# Patient Record
Sex: MALE | Race: WHITE | URBAN - METROPOLITAN AREA
[De-identification: names, ages, dates, MRNs, and addresses within clinical notes are randomized per-mention and may not be internally consistent; named-entity substitution may affect disease eponyms.]

---

## 2019-12-02 ENCOUNTER — OFFICE VISIT (OUTPATIENT)
Dept: ORTHOPEDIC SURGERY | Age: 39
End: 2019-12-02
Payer: COMMERCIAL

## 2019-12-02 VITALS
HEART RATE: 60 BPM | SYSTOLIC BLOOD PRESSURE: 150 MMHG | HEIGHT: 72 IN | BODY MASS INDEX: 23.03 KG/M2 | DIASTOLIC BLOOD PRESSURE: 82 MMHG | WEIGHT: 170 LBS

## 2019-12-02 DIAGNOSIS — M25.551 RIGHT HIP PAIN: ICD-10-CM

## 2019-12-02 DIAGNOSIS — S73.191A TEAR OF RIGHT ACETABULAR LABRUM, INITIAL ENCOUNTER: Primary | ICD-10-CM

## 2019-12-02 DIAGNOSIS — S76.311D STRAIN OF RIGHT HAMSTRING MUSCLE, SUBSEQUENT ENCOUNTER: ICD-10-CM

## 2019-12-02 PROCEDURE — 99204 OFFICE O/P NEW MOD 45 MIN: CPT | Performed by: ORTHOPAEDIC SURGERY

## 2019-12-04 ENCOUNTER — HOSPITAL ENCOUNTER (OUTPATIENT)
Dept: PHYSICAL THERAPY | Age: 39
Setting detail: THERAPIES SERIES
Discharge: HOME | End: 2019-12-04
Payer: COMMERCIAL

## 2019-12-05 ENCOUNTER — HOSPITAL ENCOUNTER (OUTPATIENT)
Dept: PHYSICAL THERAPY | Age: 39
Setting detail: THERAPIES SERIES
Discharge: HOME | End: 2019-12-05
Payer: COMMERCIAL

## 2019-12-05 PROCEDURE — 97140 MANUAL THERAPY 1/> REGIONS: CPT | Performed by: PHYSICAL THERAPIST

## 2019-12-05 PROCEDURE — 97110 THERAPEUTIC EXERCISES: CPT | Performed by: PHYSICAL THERAPIST

## 2019-12-10 ENCOUNTER — HOSPITAL ENCOUNTER (OUTPATIENT)
Dept: PHYSICAL THERAPY | Age: 39
Setting detail: THERAPIES SERIES
Discharge: HOME | End: 2019-12-10
Payer: COMMERCIAL

## 2019-12-10 PROCEDURE — 97110 THERAPEUTIC EXERCISES: CPT | Performed by: PHYSICAL THERAPIST

## 2019-12-10 PROCEDURE — 97530 THERAPEUTIC ACTIVITIES: CPT | Performed by: PHYSICAL THERAPIST

## 2019-12-10 PROCEDURE — 97140 MANUAL THERAPY 1/> REGIONS: CPT | Performed by: PHYSICAL THERAPIST

## 2019-12-12 ENCOUNTER — HOSPITAL ENCOUNTER (OUTPATIENT)
Dept: PHYSICAL THERAPY | Age: 39
Setting detail: THERAPIES SERIES
Discharge: HOME | End: 2019-12-12
Payer: COMMERCIAL

## 2019-12-12 PROCEDURE — 97110 THERAPEUTIC EXERCISES: CPT | Performed by: PHYSICAL THERAPIST

## 2019-12-12 PROCEDURE — 97140 MANUAL THERAPY 1/> REGIONS: CPT | Performed by: PHYSICAL THERAPIST

## 2019-12-12 PROCEDURE — 97530 THERAPEUTIC ACTIVITIES: CPT | Performed by: PHYSICAL THERAPIST

## 2019-12-17 ENCOUNTER — APPOINTMENT (OUTPATIENT)
Dept: PHYSICAL THERAPY | Age: 39
End: 2019-12-17
Payer: COMMERCIAL

## 2019-12-19 ENCOUNTER — HOSPITAL ENCOUNTER (OUTPATIENT)
Dept: PHYSICAL THERAPY | Age: 39
Setting detail: THERAPIES SERIES
Discharge: HOME | End: 2019-12-19
Payer: COMMERCIAL

## 2019-12-19 PROCEDURE — 97140 MANUAL THERAPY 1/> REGIONS: CPT | Performed by: PHYSICAL THERAPIST

## 2019-12-19 PROCEDURE — 97110 THERAPEUTIC EXERCISES: CPT | Performed by: PHYSICAL THERAPIST

## 2019-12-19 PROCEDURE — 97112 NEUROMUSCULAR REEDUCATION: CPT | Performed by: PHYSICAL THERAPIST

## 2020-01-03 ENCOUNTER — HOSPITAL ENCOUNTER (OUTPATIENT)
Dept: PHYSICAL THERAPY | Age: 40
Setting detail: THERAPIES SERIES
Discharge: HOME | End: 2020-01-03
Payer: COMMERCIAL

## 2020-01-03 PROCEDURE — 97112 NEUROMUSCULAR REEDUCATION: CPT | Performed by: PHYSICAL THERAPIST

## 2020-01-03 PROCEDURE — 97110 THERAPEUTIC EXERCISES: CPT | Performed by: PHYSICAL THERAPIST

## 2020-01-03 PROCEDURE — 97140 MANUAL THERAPY 1/> REGIONS: CPT | Performed by: PHYSICAL THERAPIST

## 2020-01-03 NOTE — FLOWSHEET NOTE
[facility]    Physical Therapy Daily Treatment Note  Date:  1/3/2020    Patient Name:  Aleshia     :  1980  MRN: [id number]  Restrictions/Precautions:    Medical/Treatment Diagnosis Information:  · Diagnosis: S76. 311A  Strain of muscle, fascia and tendon of the posterior muscle group at thigh level, right thigh, initial encounter. · Treatment Diagnosis: M25.551, R26.2 increased pain, decreased flexibility, decreased strength and functional deficits limit ADLs and particaption in sports  Insurance/Certification information:  PT Insurance Information: PT BENEFITS 2019 FACILITY/ ANTHEM/ EFFECTIVE 17/ ACTIVE/ DED 4000 .20/ PAYS 100%/ OOP 4000 .20/ 60 VPCY/ 0 AUTH/ JACOBY REF# [id number]/ 12-3-19 PAG  Physician Information:  Referring Practitioner: Latosha Yoo MD    Has the plan of care been signed (Y/N):        []  Yes  [x]  No     Date of Patient follow up with Physician: 19      Is this a Progress Report:     []  Yes  [x]  No        If Yes:  Date Range for reporting period:  Beginning  Ending    Progress report will be due (10 Rx or 30 days whichever is less):       Recertification will be due (POC Duration  / 90 days whichever is less): 19         Visit # Insurance Allowable Auth Required   1  (2019) 60 []  Yes [x]  No        Functional Scale:        WOMAC 17/96 (17.7% deficit)    Latex Allergy:  [x]NO      []YES  Preferred Language for Healthcare:   [x]English       []other:    Pain level:  0/10     SUBJECTIVE:  Pt states that he is feeling a little bit better. Has noticed more improvement in the front of the leg/hip flexor compared to his HS, does not notice as much discomfort with flexion. Notes that running was most aggravating factor for HS so since he has not been running it is difficult to know how much better he is doing.      OBJECTIVE:              ROM PROM AROM Comments     Left Right Left 3x10 40# SL eccectric   RDL 3x10  25# SL     Steamboats 3x30\" Forward/back, B  3x30\" ABD, B Red band at ankles   Slider SL squat  Slider backward lunge 3x10  3x10 Perform as alternating sets                            Therapeutic Exercise and NMR EXR  [x] (72759) Provided verbal/tactile cueing for activities related to strengthening, flexibility, endurance, ROM for improvements in LE, proximal hip, and core control with self care, mobility, lifting, ambulation.  [] (50578) Provided verbal/tactile cueing for activities related to improving balance, coordination, kinesthetic sense, posture, motor skill, proprioception  to assist with LE, proximal hip, and core control in self care, mobility, lifting, ambulation and eccentric single leg control. NMR and Therapeutic Activities:    [x] (29015 or 83308) Provided verbal/tactile cueing for activities related to improving balance, coordination, kinesthetic sense, posture, motor skill, proprioception and motor activation to allow for proper function of core, proximal hip and LE with self care and ADLs  [] (98322) Gait Re-education- Provided training and instruction to the patient for proper LE, core and proximal hip recruitment and positioning and eccentric body weight control with ambulation re-education including up and down stairs     Home Exercise Program:    Initiated 12/5/19. Printed hand out given. Pt demonstrated proper form of each exercise and expressed verbal understanding of frequency and duration. Updated 12/10/19. Printed hand out given. Pt demonstrated proper form of each exercise and expressed verbal understanding of frequency and duration.   [x] (16838) Reviewed/Progressed HEP activities related to strengthening, flexibility, endurance, ROM of core, proximal hip and LE for functional self-care, mobility, lifting and ambulation/stair navigation   [] (24354)Reviewed/Progressed HEP activities related to improving balance, coordination, kinesthetic sense, posture, motor skill, proprioception of core, proximal hip and LE for self care, mobility, lifting, and ambulation/stair navigation      Manual Treatments:        Spoke with   regarding the use of Dry Needling     Dry needling manual therapy: consisted on the placement of 3 needles in the following muscles:  semimenbranous/semitendinosis(2), bicep femoris (1). A 60 mm needle was inserted, piston, rotated, and coned to produce intramuscular mobilization. These techniques were used to restore functional range of motion, reduce muscle spasm and induce healing in the corresponding musculature. (36183)  Clean Technique was utilized today while applying Dry needling treatment. The treatment sites where cleaned with 70% solution of  isopropyl alcohol . The PT washed their hands and utilized treatment gloves along with hand  prior to inserting the needles. All needles where removed and discarded in the appropriate sharps container. MD has given verbal and/or written approval for this treatment. [x] (38793) Provided manual therapy to mobilize LE, proximal hip and/or LS spine soft tissue/joints for the purpose of modulating pain, promoting relaxation,  increasing ROM, reducing/eliminating soft tissue swelling/inflammation/restriction, improving soft tissue extensibility and allowing for proper ROM for normal function with self care, mobility, lifting and ambulation.      Modalities:  none    Charges:  Timed Code Treatment Minutes: 61   Total Treatment Minutes: 61   Time In: 7:30  Time out: 8:53    [] EVAL (LOW) 21552 (typically 20 minutes face-to-face)  [] EVAL (MOD) 64368 (typically 30 minutes face-to-face)  [] EVAL (HIGH) 41627 (typically 45 minutes face-to-face)  [] RE-EVAL     [x] IV(31127) x 2   [] IONTO  [x] NMR (87025) x 1    [] VASO  [x] Manual (06857) x1      [] Other:  [] TA x      [] Mech Traction (13035)  [] ES(attended) (98908)      [] ES (un) (72589):     GOALS:   Patient stated No    Treatment/Activity Tolerance:  [x] Patient able to complete treatment  [] Patient limited by fatigue  [] Patient limited by pain     [] Patient limited by other medical complications  [] Other: Initiated DN this date. Received MD approval and pt consent. Pt tolerated DN well, several LTR response noted at medial HS, pt reported discomfort with LTR but was able to tolerate full treatment. Mild LTR noted at bicep femoris, pt noted more tolerable than semimembranosis/semitendinosis. Pt tolerated needling without any adverse reactions. Continue DN NPV with focus on medial HS. Also consider needling to rectus femoris to address anterior hip pain. Pt noted some discomfort in areas of needle insertion during exercise program, but otherwise able to complete without pain. Pt able to complete steam boats without UE support this date, but performed exercise slowly. Pt able to complete backward lunges without insertional pain at the HS this date, noted only muscle fatigue. Pt was provided with return to run program as he was able to complete exercise session without HS pain. Educated on RTR program and proper progression/regression. Pt to cont with PT to address decrease flexibility, decreased strength and functional deficits. PLAN: See eval  [x] Continue per plan of care [] Alter current plan (see comments above)  [] Plan of care initiated [] Hold pending MD visit [] Discharge      Electronically signed by:  Marilee Kwan PT DPT  Physical Therapist  [id number]  [email or web address]    Note: If patient does not return for scheduled/ recommended follow up visits, this note will serve as a discharge from care along with most recent update on progress.

## 2020-01-09 ENCOUNTER — HOSPITAL ENCOUNTER (OUTPATIENT)
Dept: PHYSICAL THERAPY | Age: 40
Setting detail: THERAPIES SERIES
Discharge: HOME | End: 2020-01-09
Payer: COMMERCIAL

## 2020-01-09 PROCEDURE — 97110 THERAPEUTIC EXERCISES: CPT | Performed by: PHYSICAL THERAPIST

## 2020-01-09 PROCEDURE — 97112 NEUROMUSCULAR REEDUCATION: CPT | Performed by: PHYSICAL THERAPIST

## 2020-01-09 PROCEDURE — 97140 MANUAL THERAPY 1/> REGIONS: CPT | Performed by: PHYSICAL THERAPIST

## 2020-01-09 PROCEDURE — 20561 NDL INSJ W/O NJX 3+ MUSC: CPT | Performed by: PHYSICAL THERAPIST

## 2020-01-09 NOTE — FLOWSHEET NOTE
[facility]    Physical Therapy Daily Treatment Note  Date:  2020    Patient Name:  Pauline Davis    :  1980  MRN: [id number]  Restrictions/Precautions:    Medical/Treatment Diagnosis Information:  · Diagnosis: S76. 311A  Strain of muscle, fascia and tendon of the posterior muscle group at thigh level, right thigh, initial encounter. · Treatment Diagnosis: M25.551, R26.2 increased pain, decreased flexibility, decreased strength and functional deficits limit ADLs and particaption in sports  Insurance/Certification information:  PT Insurance Information: PT BENEFITS 2019 FACILITY/ ANTHEM/ EFFECTIVE 17/ ACTIVE/ DED 4000 .20/ PAYS 100%/ OOP 4000 .20/ 60 VPCY/ 0 AUTH/ JACOBY REF# [id number]/ 12-3-19 PAG  Physician Information:  Referring Practitioner: Salvatore Higgins MD    Has the plan of care been signed (Y/N):        []  Yes  [x]  No     Date of Patient follow up with Physician: 19      Is this a Progress Report:     []  Yes  [x]  No        If Yes:  Date Range for reporting period:  Beginning  Ending    Progress report will be due (10 Rx or 30 days whichever is less): 18      Recertification will be due (POC Duration  / 90 days whichever is less): 19         Visit # Insurance Allowable Auth Required   1  (2019) 60 []  Yes [x]  No        Functional Scale:        WOMAC 17/96 (17.7% deficit)    Latex Allergy:  [x]NO      []YES  Preferred Language for Healthcare:   [x]English       []other:    Pain level:  0/10     SUBJECTIVE:  Pt completed stage 1 of the return to run program on Saturday and didn't have trouble, walked at 4.0 mph and ran at 6.0 mph on the treadmill.  On Monday he started Stage 2 and ranged between 6.0 and 7.0 mph for the run portion, he felt like he was not really running or pushing himself so for the last 2 minute cycle he increased to 8.0 mph and could feel pain the next day walking around school. Pt states that the pain gradually improved and he has not felt pain today. Notes that side stepping and monster walks are getting easy. OBJECTIVE:              ROM PROM AROM Comments     Left Right Left Right     Flexion WNL WNL*     P! In ant hip   Extension WNL WNL         Abduction             Adduction             ER 38 30         IR 24 20                          Flexibility Left Right Comments   Hamstrings 60 SLR  90/90 55 lacking 55 SLR  90/90 45 lacking     ITB (Obers test)     N/T   Hip flexor(Sam test)     N/T   gastroc     n/a   Rectus femoris(Elys test)     N/a                 Special  Test Left Right Comments   FABERS - -     Scour test - +     Impingement test     n/a   Trendelenburg test     n/a   FADIR - + P! Ant hip   Long sit - -    Standing march hypo -        Strength Left Right Comments   Hip flexors 4+/5* 5/5 P! ant hip   Hip extension 4/5 4/5     Hip abduction 4+/5 4/5     Hip adduction         Hip ER 4+/5 4+/5     Hip IR 4+/5 4+/5     Quads 5/5 5/5     Hamstrings 4+/5 3+/5 Prone knee extened      SL Balance EO R 30 sec L 30 Sec stable ankle strategy                           EC R 15 sec  L 4 sec step strategy    Joint mobility:               [x]? Normal                       []?Hypo              []?Hyper     Palpation: no TTP in ant hip or hamstrings at this time     Functional Mobility/Transfers: Independent      Posture: normal     Gait: (include devices/WB status) normal     Pelvis- iliac crest L higher, PSIS L higher, Level ASIS,     RESTRICTIONS/PRECAUTIONS: none    Exercises/Interventions:     ROM/STRETCHES     Seated hamstring  3x30\"    Seated piriformis     Supine piriformis     Supine figure 4     Quad       ITB     Butterfly     Tiger tail      Hip flexor stretch 3x30\" Prone strap   PREs     SLR     abduction     adduction     Supine abduction with tband     Seated hip flexion with ER     clams 3x10 blue ER/IR combo   SL dead lift     Monster walks     Wall sit with tband     bridges 2x10 triple x red SB   Quadruped opposite LE/UE reaches     Stool scoots  90\"/1.22\"/ 1.12'   SLS  3x30\" B BOSU   Side stepping 2 passes silver    Monster walks 2 passes silver    Hamstring curls 3x10 40# SL eccectric   RDL 3x10 25# SL     Steamboats  Red band at ankles   Slider SL squat  Slider backward lunge  Perform as alternating sets   Split squat 3x10 R only    Heel tap 3x10 R only                   Therapeutic Exercise and NMR EXR  [x] (27957) Provided verbal/tactile cueing for activities related to strengthening, flexibility, endurance, ROM for improvements in LE, proximal hip, and core control with self care, mobility, lifting, ambulation.  [] (02606) Provided verbal/tactile cueing for activities related to improving balance, coordination, kinesthetic sense, posture, motor skill, proprioception  to assist with LE, proximal hip, and core control in self care, mobility, lifting, ambulation and eccentric single leg control. NMR and Therapeutic Activities:    [x] (01405 or 32755) Provided verbal/tactile cueing for activities related to improving balance, coordination, kinesthetic sense, posture, motor skill, proprioception and motor activation to allow for proper function of core, proximal hip and LE with self care and ADLs  [] (35590) Gait Re-education- Provided training and instruction to the patient for proper LE, core and proximal hip recruitment and positioning and eccentric body weight control with ambulation re-education including up and down stairs     Home Exercise Program:    Initiated 12/5/19. Printed hand out given. Pt demonstrated proper form of each exercise and expressed verbal understanding of frequency and duration. Updated 12/10/19. Printed hand out given. Pt demonstrated proper form of each exercise and expressed verbal understanding of frequency and duration.   [x] (75906) Reviewed/Progressed HEP activities related to strengthening, flexibility, endurance, ROM of core, proximal hip and LE for functional self-care, mobility, lifting and ambulation/stair navigation   [] (32910)Reviewed/Progressed HEP activities related to improving balance, coordination, kinesthetic sense, posture, motor skill, proprioception of core, proximal hip and LE for self care, mobility, lifting, and ambulation/stair navigation      Manual Treatments:    [facility] 5'    Spoke with   regarding the use of Dry Needling     Dry needling manual therapy: consisted on the placement of 3 needles in the following muscles:  semimenbranous/semitendinosis(4), quadriceps femoris (2). A 60 mm needle was inserted, piston, rotated, and coned to produce intramuscular mobilization. These techniques were used to restore functional range of motion, reduce muscle spasm and induce healing in the corresponding musculature. (00107)  Clean Technique was utilized today while applying Dry needling treatment. The treatment sites where cleaned with 70% solution of  isopropyl alcohol . The PT washed their hands and utilized treatment gloves along with hand  prior to inserting the needles. All needles where removed and discarded in the appropriate sharps container. MD has given verbal and/or written approval for this treatment. [x] (05308) Provided manual therapy to mobilize LE, proximal hip and/or LS spine soft tissue/joints for the purpose of modulating pain, promoting relaxation,  increasing ROM, reducing/eliminating soft tissue swelling/inflammation/restriction, improving soft tissue extensibility and allowing for proper ROM for normal function with self care, mobility, lifting and ambulation.      Modalities:  none    Charges:  Timed Code Treatment Minutes: 53   Total Treatment Minutes: 80   Time In: 3:30  Time out: 4:50    [] EVAL (LOW) 18257 (typically 20 minutes face-to-face)  [] EVAL (MOD) 78874 (typically 30 minutes face-to-face)  [] EVAL (HIGH) 10713 (typically 45 minutes face-to-face)  [] RE-EVAL     [x] GL(93798) x 2   [] IONTO  [x] NMR (53781) x 1    [] VASO  [x] Manual (23412) x1      [x] Other: DN: 3 muscles  [] TA x      [] Mech Traction (30850)  [] ES(attended) (88554)      [] ES (un) (86501):     GOALS:   Patient stated goal: Return to running in best possible capacity, triathlon in summer   []? Progressing: []? Met: []? Not Met: []? Adjusted     Therapist goals for Patient:   Short Term Goals: To be achieved in: 2 weeks  1. Independent in HEP and progression per patient tolerance, in order to prevent re-injury. []? Progressing: []? Met: []? Not Met: []? Adjusted   2. Patient will have a decrease in pain to facilitate improvement in movement, function, and ADLs as indicated by Functional Deficits. []? Progressing: []? Met: []? Not Met: []? Adjusted     Long Term Goals: To be achieved in: 8 weeks  1. Disability index score of 5% or less for the LEFS to assist with reaching prior level of function. []? Progressing: []? Met: []? Not Met: []? Adjusted  2. Patient will demonstrate increased flexibility to <40 degrees lacking for hamstrings to allow for proper joint functioning as indicated by patients Functional Deficits. []? Progressing: []? Met: []? Not Met: []? Adjusted  3. Patient will demonstrate an increase in Strength to 4+/5 in LE to allow for proper functional mobility as indicated by patients Functional Deficits. []? Progressing: []? Met: []? Not Met: []? Adjusted  4. Patient will return to runninh without increased symptoms or restriction. []? Progressing: []? Met: []? Not Met: []? Adjusted       Overall Progression Towards Functional goals/ Treatment Progress Update:  [] Patient is progressing as expected towards functional goals listed. [] Progression is slowed due to complexities/Impairments listed. [] Progression has been slowed due to co-morbidities.   [x] Plan just implemented, too soon to assess goals progression <30days   [] Goals require adjustment due to lack

## 2020-01-16 ENCOUNTER — HOSPITAL ENCOUNTER (OUTPATIENT)
Dept: PHYSICAL THERAPY | Age: 40
Setting detail: THERAPIES SERIES
Discharge: HOME | End: 2020-01-16
Payer: COMMERCIAL

## 2020-01-16 PROCEDURE — 97110 THERAPEUTIC EXERCISES: CPT | Performed by: PHYSICAL THERAPIST

## 2020-01-16 PROCEDURE — 20561 NDL INSJ W/O NJX 3+ MUSC: CPT | Performed by: PHYSICAL THERAPIST

## 2020-01-16 PROCEDURE — 97140 MANUAL THERAPY 1/> REGIONS: CPT | Performed by: PHYSICAL THERAPIST

## 2020-01-16 PROCEDURE — 97112 NEUROMUSCULAR REEDUCATION: CPT | Performed by: PHYSICAL THERAPIST

## 2020-01-16 NOTE — FLOWSHEET NOTE
The [address] [place] [address] and Sports Rehabilitation, Elvis [facility]    Physical Therapy Daily Treatment Note  Date:  2020    Patient Name:  Dolly Montejo    :  1980  MRN: [id number]  Restrictions/Precautions:    Medical/Treatment Diagnosis Information:  · Diagnosis: S76. 311A  Strain of muscle, fascia and tendon of the posterior muscle group at thigh level, right thigh, initial encounter. · Treatment Diagnosis: M25.551, R26.2 increased pain, decreased flexibility, decreased strength and functional deficits limit ADLs and particaption in sports  Insurance/Certification information:  PT Insurance Information: PT BENEFITS 2019 FACILITY/ ANTHEM/ EFFECTIVE 17/ ACTIVE/ DED 4000 .20/ PAYS 100%/ OOP 4000 .20/ 60 VPCY/ 0 AUTH/ JACOBY REF# [id number]/ 12-3-19 PAG  Physician Information:  Referring Practitioner: Angelica Rincon MD    Has the plan of care been signed (Y/N):        []  Yes  [x]  No     Date of Patient follow up with Physician: 19      Is this a Progress Report:     []  Yes  [x]  No        If Yes:  Date Range for reporting period:  Beginning  Ending    Progress report will be due (10 Rx or 30 days whichever is less): 3/3/46      Recertification will be due (POC Duration  / 90 days whichever is less): 19         Visit # Insurance Allowable Auth Required   3  (2019) 60 []  Yes [x]  No        Functional Scale:        WOMAC 17/96 (17.7% deficit)    Latex Allergy:  [x]NO      []YES  Preferred Language for Healthcare:   [x]English       []other:    Pain level:  0/10     SUBJECTIVE:  Pt progressed to RTR Stage 2 on Friday, Monday stage 3 both fine. Yesterday stage 4, states that by the final round of the 4' run he had some pain, and had pain while walking around at school. OBJECTIVE:              ROM PROM AROM Comments     Left Right Left Right     Flexion WNL WNL*     P!  In ant hip   Extension WNL WNL         Abduction             Adduction             ER 38 30     only    Heel tap 3x10 R only    Step up 3x10 Work out bench             Weight machines     HS curl 3x10 SL 20#    Leg press          Therapeutic Exercise and NMR EXR  [x] (98040) Provided verbal/tactile cueing for activities related to strengthening, flexibility, endurance, ROM for improvements in LE, proximal hip, and core control with self care, mobility, lifting, ambulation.  [] (90688) Provided verbal/tactile cueing for activities related to improving balance, coordination, kinesthetic sense, posture, motor skill, proprioception  to assist with LE, proximal hip, and core control in self care, mobility, lifting, ambulation and eccentric single leg control. NMR and Therapeutic Activities:    [x] (83914 or 86387) Provided verbal/tactile cueing for activities related to improving balance, coordination, kinesthetic sense, posture, motor skill, proprioception and motor activation to allow for proper function of core, proximal hip and LE with self care and ADLs  [] (54383) Gait Re-education- Provided training and instruction to the patient for proper LE, core and proximal hip recruitment and positioning and eccentric body weight control with ambulation re-education including up and down stairs     Home Exercise Program:    Initiated 12/5/19. Printed hand out given. Pt demonstrated proper form of each exercise and expressed verbal understanding of frequency and duration. Updated 12/10/19. Printed hand out given. Pt demonstrated proper form of each exercise and expressed verbal understanding of frequency and duration. Updated 1/16/20. Printed handout given. Pt demonstrated proper form of each exercise and expressed verbal understanding of frequency and duration.   [x] (38220) Reviewed/Progressed HEP activities related to strengthening, flexibility, endurance, ROM of core, proximal hip and LE for functional self-care, mobility, lifting and ambulation/stair navigation   [] (82119)Reviewed/Progressed HEP activities related to improving balance, coordination, kinesthetic sense, posture, motor skill, proprioception of core, proximal hip and LE for self care, mobility, lifting, and ambulation/stair navigation      Manual Treatments:    [facility] 5'    Spoke with   regarding the use of Dry Needling     Dry needling manual therapy: consisted on the placement of 5 needles in the following muscles:  semimenbranous/semitendinosis(3), quadriceps femoris (2). A 60 mm needle was inserted, piston, rotated, and coned to produce intramuscular mobilization. These techniques were used to restore functional range of motion, reduce muscle spasm and induce healing in the corresponding musculature. (92903)  Clean Technique was utilized today while applying Dry needling treatment. The treatment sites where cleaned with 70% solution of  isopropyl alcohol . The PT washed their hands and utilized treatment gloves along with hand  prior to inserting the needles. All needles where removed and discarded in the appropriate sharps container. MD has given verbal and/or written approval for this treatment. [x] (21486) Provided manual therapy to mobilize LE, proximal hip and/or LS spine soft tissue/joints for the purpose of modulating pain, promoting relaxation,  increasing ROM, reducing/eliminating soft tissue swelling/inflammation/restriction, improving soft tissue extensibility and allowing for proper ROM for normal function with self care, mobility, lifting and ambulation.      Modalities:  none    Charges:  Timed Code Treatment Minutes: 55   Total Treatment Minutes: 55   Time In: 3:34  Time out: 5:08    [] EVAL (LOW) 89711 (typically 20 minutes face-to-face)  [] EVAL (MOD) 61679 (typically 30 minutes face-to-face)  [] EVAL (HIGH) 19673 (typically 45 minutes face-to-face)  [] RE-EVAL     [x] MJ(32337) x 1   [] IONTO  [x] NMR (70216) x 1    [] VASO  [x] Manual (38526) x1      [x] Other: DN: 3 muscles  [] TA x      [] Mech Traction (83118)  [] ES(attended) (42679)      [] ES (un) (76798):     GOALS:   Patient stated goal: Return to running in best possible capacity, triathlon in summer   []? Progressing: []? Met: []? Not Met: []? Adjusted     Therapist goals for Patient:   Short Term Goals: To be achieved in: 2 weeks  1. Independent in HEP and progression per patient tolerance, in order to prevent re-injury. []? Progressing: []? Met: []? Not Met: []? Adjusted   2. Patient will have a decrease in pain to facilitate improvement in movement, function, and ADLs as indicated by Functional Deficits. []? Progressing: []? Met: []? Not Met: []? Adjusted     Long Term Goals: To be achieved in: 8 weeks  1. Disability index score of 5% or less for the LEFS to assist with reaching prior level of function. []? Progressing: []? Met: []? Not Met: []? Adjusted  2. Patient will demonstrate increased flexibility to <40 degrees lacking for hamstrings to allow for proper joint functioning as indicated by patients Functional Deficits. []? Progressing: []? Met: []? Not Met: []? Adjusted  3. Patient will demonstrate an increase in Strength to 4+/5 in LE to allow for proper functional mobility as indicated by patients Functional Deficits. []? Progressing: []? Met: []? Not Met: []? Adjusted  4. Patient will return to runninh without increased symptoms or restriction. []? Progressing: []? Met: []? Not Met: []? Adjusted       Overall Progression Towards Functional goals/ Treatment Progress Update:  [] Patient is progressing as expected towards functional goals listed. [] Progression is slowed due to complexities/Impairments listed. [] Progression has been slowed due to co-morbidities.   [x] Plan just implemented, too soon to assess goals progression <30days   [] Goals require adjustment due to lack of progress  [] Patient is not progressing as expected and requires additional follow up with physician  [] Other    Prognosis for POC: [x] Good []

## 2020-01-22 ENCOUNTER — HOSPITAL ENCOUNTER (OUTPATIENT)
Dept: PHYSICAL THERAPY | Age: 40
Setting detail: THERAPIES SERIES
Discharge: HOME | End: 2020-01-22
Payer: COMMERCIAL

## 2020-01-22 PROCEDURE — 97110 THERAPEUTIC EXERCISES: CPT | Performed by: PHYSICAL THERAPIST

## 2020-01-22 PROCEDURE — 97112 NEUROMUSCULAR REEDUCATION: CPT | Performed by: PHYSICAL THERAPIST

## 2020-01-22 PROCEDURE — 97140 MANUAL THERAPY 1/> REGIONS: CPT | Performed by: PHYSICAL THERAPIST

## 2020-01-22 PROCEDURE — 20561 NDL INSJ W/O NJX 3+ MUSC: CPT | Performed by: PHYSICAL THERAPIST

## 2020-01-22 NOTE — FLOWSHEET NOTE
[address] and Sports Rehabilitation, [facility]    Physical Therapy Daily Treatment Note  Date:  2020    Patient Name:  Chuck Mckeon    :  1980  MRN: [id number]  Restrictions/Precautions:    Medical/Treatment Diagnosis Information:  · Diagnosis: S76. 311A  Strain of muscle, fascia and tendon of the posterior muscle group at thigh level, right thigh, initial encounter. · Treatment Diagnosis: M25.551, R26.2 increased pain, decreased flexibility, decreased strength and functional deficits limit ADLs and particaption in sports  Insurance/Certification information:  PT Insurance Information: PT BENEFITS 2019 FACILITY/ ANTHEM/ EFFECTIVE 17/ ACTIVE/ DED 4000 .20/ PAYS 100%/ OOP 4000 .20/ 60 VPCY/ 0 AUTH/ JACOBY REF# [id number]/ 12-3-19 PAG  Physician Information:  Referring Practitioner: Rachelle Kennedy MD    Has the plan of care been signed (Y/N):        []  Yes  [x]  No     Date of Patient follow up with Physician: 19      Is this a Progress Report:     []  Yes  [x]  No        If Yes:  Date Range for reporting period:  Beginning  Ending    Progress report will be due (10 Rx or 30 days whichever is less):       Recertification will be due (POC Duration  / 90 days whichever is less): 19         Visit # Insurance Allowable Auth Required   3  (2019) 60 []  Yes [x]  No        Functional Scale:        WOMAC 17/96 (17.7% deficit)    Latex Allergy:  [x]NO      []YES  Preferred Language for Healthcare:   [x]English       []other:    Pain level:  0/10     SUBJECTIVE:  Pt states that he has noticed some R sided back discomfort a little more than a week ago, states it is more of a discomfort and annoyance, does not limit him and is not exacerbated by anything specific. Has not bothered him during any of HEP. Pt states that he has had muscle soreness from the exercise program, no pain in the HS.  He has run 1x since LPV, maintain 3' x 3' stage.    OBJECTIVE:              ROM PROM AROM Comments     Left Right Left Right     Flexion WNL WNL*     P! In ant hip   Extension WNL WNL         Abduction             Adduction             ER 38 30         IR 24 20                          Flexibility Left Right Comments   Hamstrings 60 SLR  90/90 55 lacking 55 SLR  90/90 45 lacking     ITB (Obers test)     N/T   Hip flexor(Sam test)     N/T   gastroc     n/a   Rectus femoris(Elys test)     N/a                 Special  Test Left Right Comments   FABERS - -     Scour test - +     Impingement test     n/a   Trendelenburg test     n/a   FADIR - + P! Ant hip   Long sit - -    Standing march hypo -        Strength Left Right Comments   Hip flexors 4+/5* 5/5 P! ant hip   Hip extension 4/5 4/5     Hip abduction 4+/5 4/5     Hip adduction         Hip ER 4+/5 4+/5     Hip IR 4+/5 4+/5     Quads 5/5 5/5     Hamstrings 4+/5 3+/5 Prone knee extened      SL Balance EO R 30 sec L 30 Sec stable ankle strategy                           EC R 15 sec  L 4 sec step strategy    Joint mobility:               [x]? Normal                       []?Hypo              []?Hyper     Palpation: no TTP in ant hip or hamstrings at this time     Functional Mobility/Transfers: Independent      Posture: normal     Gait: (include devices/WB status) normal     Pelvis- iliac crest L higher, PSIS L higher, Level ASIS,     RESTRICTIONS/PRECAUTIONS: none    Exercises/Interventions:     ROM/STRETCHES     Bike 5' warm up before DN    Seated hamstring  3x30\"    Seated piriformis     Supine piriformis     Supine figure 4     Quad       ITB     Foam roll x15 QL    Child's post 3x30\" Bias R side   Hip flexor stretch 3x30\" Prone strap   PREs     SLR     abduction     adduction     Supine abduction with tband     Seated hip flexion with ER     clams  ER/IR combo   SL dead lift     Monster walks     Wall sit  3x30\" LLE on ball   bridges SL bridge 3x10    Bird dog 10x3\" B    Stool scoots     SLS  3x30\" B  3x30\" B BOSU  Level ground, eyes closed   Side stepping    Monster walks    RDL   3x10 15# DL    Steamboats  Red band at ankles   Slider SL squat  Slider backward lunge  Perform as alternating sets   Split squat     Heel tap     Step up  Work out bench             Weight machines     HS curl     Leg press 3x10 #         Therapeutic Exercise and NMR EXR  [x] (06832) Provided verbal/tactile cueing for activities related to strengthening, flexibility, endurance, ROM for improvements in LE, proximal hip, and core control with self care, mobility, lifting, ambulation.  [] (97520) Provided verbal/tactile cueing for activities related to improving balance, coordination, kinesthetic sense, posture, motor skill, proprioception  to assist with LE, proximal hip, and core control in self care, mobility, lifting, ambulation and eccentric single leg control. NMR and Therapeutic Activities:    [x] (42584 or 48902) Provided verbal/tactile cueing for activities related to improving balance, coordination, kinesthetic sense, posture, motor skill, proprioception and motor activation to allow for proper function of core, proximal hip and LE with self care and ADLs  [] (54038) Gait Re-education- Provided training and instruction to the patient for proper LE, core and proximal hip recruitment and positioning and eccentric body weight control with ambulation re-education including up and down stairs     Home Exercise Program:    Initiated 12/5/19. Printed hand out given. Pt demonstrated proper form of each exercise and expressed verbal understanding of frequency and duration. Updated 12/10/19. Printed hand out given. Pt demonstrated proper form of each exercise and expressed verbal understanding of frequency and duration. Updated 1/16/20. Printed handout given. Pt demonstrated proper form of each exercise and expressed verbal understanding of frequency and duration.   [x] (23247) Reviewed/Progressed HEP activities related to minutes face-to-face)  [] EVAL (MOD) 42389 (typically 30 minutes face-to-face)  [] EVAL (HIGH) 68175 (typically 45 minutes face-to-face)  [] RE-EVAL     [x] GJ(44974) x 2  [] IONTO  [x] NMR (56737) x 1    [] VASO  [x] Manual (82420) x1      [x] Other: DN: 3 muscles  [] TA x      [] Mech Traction (71655)  [] ES(attended) (36774)      [] ES (un) (05267):     GOALS:   Patient stated goal: Return to running in best possible capacity, triathlon in summer   []? Progressing: []? Met: []? Not Met: []? Adjusted     Therapist goals for Patient:   Short Term Goals: To be achieved in: 2 weeks  1. Independent in HEP and progression per patient tolerance, in order to prevent re-injury. []? Progressing: []? Met: []? Not Met: []? Adjusted   2. Patient will have a decrease in pain to facilitate improvement in movement, function, and ADLs as indicated by Functional Deficits. []? Progressing: []? Met: []? Not Met: []? Adjusted     Long Term Goals: To be achieved in: 8 weeks  1. Disability index score of 5% or less for the LEFS to assist with reaching prior level of function. []? Progressing: []? Met: []? Not Met: []? Adjusted  2. Patient will demonstrate increased flexibility to <40 degrees lacking for hamstrings to allow for proper joint functioning as indicated by patients Functional Deficits. []? Progressing: []? Met: []? Not Met: []? Adjusted  3. Patient will demonstrate an increase in Strength to 4+/5 in LE to allow for proper functional mobility as indicated by patients Functional Deficits. []? Progressing: []? Met: []? Not Met: []? Adjusted  4. Patient will return to runninh without increased symptoms or restriction. []? Progressing: []? Met: []? Not Met: []? Adjusted       Overall Progression Towards Functional goals/ Treatment Progress Update:  [] Patient is progressing as expected towards functional goals listed. [] Progression is slowed due to complexities/Impairments listed.   [] Progression has been progress.

## 2020-01-29 ENCOUNTER — HOSPITAL ENCOUNTER (OUTPATIENT)
Dept: PHYSICAL THERAPY | Age: 40
Setting detail: THERAPIES SERIES
Discharge: HOME | End: 2020-01-29
Payer: COMMERCIAL

## 2020-01-29 PROCEDURE — 97112 NEUROMUSCULAR REEDUCATION: CPT | Performed by: PHYSICAL THERAPIST

## 2020-01-29 PROCEDURE — 20561 NDL INSJ W/O NJX 3+ MUSC: CPT | Performed by: PHYSICAL THERAPIST

## 2020-01-29 PROCEDURE — 97110 THERAPEUTIC EXERCISES: CPT | Performed by: PHYSICAL THERAPIST

## 2020-01-29 PROCEDURE — 97140 MANUAL THERAPY 1/> REGIONS: CPT | Performed by: PHYSICAL THERAPIST

## 2020-01-29 NOTE — PLAN OF CARE
he is still unable to run to his usual desire and capacity d/t pain. Pt to cont with PT to address decrease flexibility, decreased strength and functional deficits. Pt would benefit from additional 4 weeks of PT to address remaining strength and flexibility deficits. Progress report will be due (10 Rx or 30 days whichever is less): 0/66/07      Recertification will be due (POC Duration  / 90 days whichever is less): 3/11/20         Visit # Insurance Allowable Auth Required   4  (4 2019) 60 []  Yes [x]  No        Functional Scale:         LEFS 71/80=88.75% (11.25% deficit)     Latex Allergy:  [x]NO      []YES  Preferred Language for Healthcare:   [x]English       []other:    Pain level:  0/10     SUBJECTIVE:  Pt states that he ran yesterday, still on Stage 3, states he felt fine. Overall pt feels good but gets occasional twinge of pain. Hip flexor gets occasional pinch when he gets past hip level. Pt states that HS and hip flexor were most agitated when running so it is difficult to know how improved he is.      OBJECTIVE:              ROM PROM AROM Comments     Left Right Left Right     Flexion WNL WNL        Extension WNL WNL         Abduction             Adduction             ER 38 30         IR 24 20                          Flexibility Left Right Comments   Hamstrings 70 SLR  90/90 40 lacking 65 SLR  90/90 25 lacking     ITB (Obers test)     N/T   Hip flexor(Sam test)     N/T   gastroc     n/a   Rectus femoris(Elys test)     N/a                 Special  Test Left Right Comments   FABERS - -     Scour test - -     Impingement test  -  -    Trendelenburg test  -  -    FADIR - -    Long sit - -    Standing march hypo -        Strength Left Right Comments   Hip flexors 4+/5* 5/5 *Discomfort anterior hip   Hip extension 4/5 4/5     Hip abduction 4+/5 4/5     Hip adduction         Hip ER 4+/5 4+/5     Hip IR 4+/5 4+/5     Quads 5/5 5/5     Hamstrings 4+/5 4/5 Prone knee extended and knee bent      SL Balance: lifting, ambulation and eccentric single leg control. NMR and Therapeutic Activities:    [x] (46335 or 02555) Provided verbal/tactile cueing for activities related to improving balance, coordination, kinesthetic sense, posture, motor skill, proprioception and motor activation to allow for proper function of core, proximal hip and LE with self care and ADLs  [] (65384) Gait Re-education- Provided training and instruction to the patient for proper LE, core and proximal hip recruitment and positioning and eccentric body weight control with ambulation re-education including up and down stairs     Home Exercise Program:    Initiated 12/5/19. Printed hand out given. Pt demonstrated proper form of each exercise and expressed verbal understanding of frequency and duration. Updated 12/10/19. Printed hand out given. Pt demonstrated proper form of each exercise and expressed verbal understanding of frequency and duration. Updated 1/16/20. Printed handout given. Pt demonstrated proper form of each exercise and expressed verbal understanding of frequency and duration. [x] (34043) Reviewed/Progressed HEP activities related to strengthening, flexibility, endurance, ROM of core, proximal hip and LE for functional self-care, mobility, lifting and ambulation/stair navigation   [] (21081)Reviewed/Progressed HEP activities related to improving balance, coordination, kinesthetic sense, posture, motor skill, proprioception of core, proximal hip and LE for self care, mobility, lifting, and ambulation/stair navigation      Manual Treatments:    IASTM HG prior to DN  Sweeping scanning to medial HS with HG8  Brushing and strumming to medial HS with HG9    Spoke with   regarding the use of Dry Needling     Dry needling manual therapy: consisted on the placement of 5 needles in the following muscles:  semimenbranous/semitendinosis(3), quadriceps femoris (2).   A 60 mm needle was inserted, piston, rotated, and coned to produce movement, function, and ADLs as indicated by Functional Deficits. [x]? Progressing: []? Met: []? Not Met: []? Adjusted      Long Term Goals: To be achieved in: 8 weeks  1. Disability index score of 5% or less for the LEFS to assist with reaching prior level of function. [x]? Progressing: []? Met: []? Not Met: []? Adjusted  2. Patient will demonstrate increased flexibility to <40 degrees lacking for hamstrings to allow for proper joint functioning as indicated by patients Functional Deficits. [x]? Progressing: []? Met: []? Not Met: []? Adjusted  3. Patient will demonstrate an increase in Strength to 4+/5 in LE to allow for proper functional mobility as indicated by patients Functional Deficits. [x]? Progressing: []? Met: []? Not Met: []? Adjusted  4. Patient will return to runninh without increased symptoms or restriction. [x]? Progressing: []? Met: []? Not Met: []? Adjusted       Overall Progression Towards Functional goals/ Treatment Progress Update:  [] Patient is progressing as expected towards functional goals listed. [] Progression is slowed due to complexities/Impairments listed. [] Progression has been slowed due to co-morbidities. [x] Plan just implemented, too soon to assess goals progression <30days   [] Goals require adjustment due to lack of progress  [] Patient is not progressing as expected and requires additional follow up with physician  [] Other    Prognosis for POC: [x] Good [] Fair  [] Poor      Patient requires continued skilled intervention: [x] Yes  [] No    Treatment/Activity Tolerance:  [x] Patient able to complete treatment  [] Patient limited by fatigue  [] Patient limited by pain     [] Patient limited by other medical complications  [] Other: 5th session of DN this date. Performed IASTM with HG to medial quad prior to DN again this date, pt tolerated well, quick erythremic response, less adhesions compared to LPV.  Good tolerance to DN, muscle integrity was smoother with needle insertion compared to prior sessions, less intense and less frequent twitch response noted this date. Pt demonstrates good improvement in HS flexibility since IE, greater improvement of R HS compared to L; despite improvement in flexibility moderate deficits still persist B. Pt demonstrates improved RLE strength, though HS and glute weakness on R still present in comparison to L. Pt noted discomfort with resisted hip flex though no longer pain as it was on IE. Pt is able to complete ADLs and IADLs without pain. Pt has tolerated initiation of return to run program, though he is still unable to run to his usual desire and capacity d/t pain. Pt to cont with PT to address decrease flexibility, decreased strength and functional deficits. Pt would benefit from additional 4 weeks of PT to address remaining strength and flexibility deficits. PLAN: See eval  [x] Continue per plan of care [] Alter current plan (see comments above)  [] Plan of care initiated [] Hold pending MD visit [] Discharge      Electronically signed by:  Scott Hook PT DPT  Physical Therapist  [id number]  [email or web address]    Note: If patient does not return for scheduled/ recommended follow up visits, this note will serve as a discharge from care along with most recent update on progress.

## 2020-02-03 ENCOUNTER — OFFICE VISIT (OUTPATIENT)
Dept: ORTHOPEDIC SURGERY | Age: 40
End: 2020-02-03
Payer: COMMERCIAL

## 2020-02-03 VITALS — BODY MASS INDEX: 24.38 KG/M2 | WEIGHT: 180 LBS | HEIGHT: 72 IN

## 2020-02-03 PROCEDURE — 99212 OFFICE O/P EST SF 10 MIN: CPT | Performed by: ORTHOPAEDIC SURGERY

## 2020-02-03 NOTE — PROGRESS NOTES
Chief Complaint  Follow-up (right leg. pt states that he is doing a little better than he was )      History of Present Illness:  Amparo Negro is a pleasant 44 y.o. male here today for followup of right hip/hamstring. We have been treating him conservatively for right hamstring tendinosis and hip impingement. She has been formal supervised physical therapy and OTC NSAIDs. Comes in today reporting he is trending in a good direction. He continues to feel he gets relief from physical therapy. No new injuries reported. Medical History:  Patient's medications, allergies, past medical, surgical, social and family histories were reviewed and updated as appropriate. ROS: Review of systems reviewed from Patient History Form completed today and available in the patient's chart under the Media tab. Pertinent items are noted in HPI  Review of systems reviewed from Patient History Form completed today and available in the patient's chart under the Media tab. Vital Signs: There were no vitals taken for this visit. Neuro: Alert & oriented x 3,  normal,  no focal deficits noted. Normal affect. Eyes: sclera clear  Ears: Normal external ear  Mouth:  No perioral lesions  Pulm: Respirations unlabored and regular  Pulse: Extremities well perfused. 2+ peripheral pulses. Skin: Warm. No ulcerations. Constitutional: The physical examination finds the patient to be well-developed and well-nourished. The patient is alert and oriented x3 and was cooperative throughout the visit. RIGHT Hip Examination:    Gait: Normal     Skin: There are no rashes, ulcerations or lesions.     Inspection:  No erythema swelling or signs of infection. Leg lengths symmetric. No evidence of hip flexion contracture.     Palpation: Hamstring palpable to ischial tuberosity. No tenderness over greater trochanter. Nontender over the gluteus medius. .  No palpable masses.     Range of Motion: Tight hamstrings bilaterally.  Full

## 2020-02-05 ENCOUNTER — HOSPITAL ENCOUNTER (OUTPATIENT)
Dept: PHYSICAL THERAPY | Age: 40
Setting detail: THERAPIES SERIES
Discharge: HOME | End: 2020-02-05
Payer: COMMERCIAL

## 2020-02-05 PROCEDURE — 20560 NDL INSJ W/O NJX 1 OR 2 MUSC: CPT | Performed by: PHYSICAL THERAPIST

## 2020-02-05 PROCEDURE — 97140 MANUAL THERAPY 1/> REGIONS: CPT | Performed by: PHYSICAL THERAPIST

## 2020-02-05 PROCEDURE — 97112 NEUROMUSCULAR REEDUCATION: CPT | Performed by: PHYSICAL THERAPIST

## 2020-02-05 PROCEDURE — 97110 THERAPEUTIC EXERCISES: CPT | Performed by: PHYSICAL THERAPIST

## 2020-02-05 NOTE — FLOWSHEET NOTE
Right     Flexion WNL WNL        Extension WNL WNL         Abduction             Adduction             ER 38 30         IR 24 20                          Flexibility Left Right Comments   Hamstrings 70 SLR  90/90 40 lacking 65 SLR  90/90 25 lacking     ITB (Obers test)     N/T   Hip flexor(Sam test)     N/T   gastroc     n/a   Rectus femoris(Elys test)     N/a                 Special  Test Left Right Comments   FABERS - -     Scour test - -     Impingement test  -  -    Trendelenburg test  -  -    FADIR - -    Long sit - -    Standing march hypo -        Strength Left Right Comments   Hip flexors 4+/5* 5/5 *Discomfort anterior hip   Hip extension 4/5 4/5     Hip abduction 4+/5 4/5     Hip adduction         Hip ER 4+/5 4+/5     Hip IR 4+/5 4+/5     Quads 5/5 5/5     Hamstrings 4+/5 4/5 Prone knee extended and knee bent      SL Balance:   EO R 30 sec L 30 Sec stable ankle strategy   EC R 15 sec  L 4 sec step strategy    Joint mobility:               [x]? Normal                       []?Hypo              []?Hyper     Palpation: no TTP in ant hip or hamstrings at this time     Functional Mobility/Transfers: Independent      Posture: normal     Gait: (include devices/WB status) normal     Pelvis- iliac crest L higher, PSIS L higher, Level ASIS,     RESTRICTIONS/PRECAUTIONS: none    Exercises/Interventions:     ROM/STRETCHES     Bike 5' warm up before DN    Seated hamstring  3x30\"    Seated piriformis     Supine piriformis     Supine figure 4     Quad       ITB     Foam roll    Child's post Bias R side   Hip flexor stretch 3x30\"Prone strap   PREs     clams  ER/IR combo   Wall sit   LLE on ball   Bridges 3x10 Feet on sliders   SL bridge 3x10 R only    Isometric hip flex  RLE on bench, lift on    Hip Thruster 3x10 20/25/25# Back supported on workout bench   Bird dog     Stool scoots     SLS    3x30\" B BOSU  Level ground, eyes closed   Side stepping    Monster walks    RDL 3x10 25# DL    Steamboats  Red band at ankles Slider SL squat  Slider backward lunge  Perform as alternating sets   Split squat     Heel tap     Step up  Work out bench   CC standing extension 3x10 15#    Prone knee flex 3x10 10/15/20#    Velia wall slide 3x10    Side plank 3x30\" R          Weight machines     HS curl     Leg press          Therapeutic Exercise and NMR EXR  [x] (60228) Provided verbal/tactile cueing for activities related to strengthening, flexibility, endurance, ROM for improvements in LE, proximal hip, and core control with self care, mobility, lifting, ambulation.  [] (75496) Provided verbal/tactile cueing for activities related to improving balance, coordination, kinesthetic sense, posture, motor skill, proprioception  to assist with LE, proximal hip, and core control in self care, mobility, lifting, ambulation and eccentric single leg control. NMR and Therapeutic Activities:    [x] (05155 or 81811) Provided verbal/tactile cueing for activities related to improving balance, coordination, kinesthetic sense, posture, motor skill, proprioception and motor activation to allow for proper function of core, proximal hip and LE with self care and ADLs  [] (18263) Gait Re-education- Provided training and instruction to the patient for proper LE, core and proximal hip recruitment and positioning and eccentric body weight control with ambulation re-education including up and down stairs     Home Exercise Program:    Initiated 12/5/19. Printed hand out given. Pt demonstrated proper form of each exercise and expressed verbal understanding of frequency and duration. Updated 12/10/19. Printed hand out given. Pt demonstrated proper form of each exercise and expressed verbal understanding of frequency and duration. Updated 1/16/20. Printed handout given. Pt demonstrated proper form of each exercise and expressed verbal understanding of frequency and duration.   [x] (89216) Reviewed/Progressed HEP activities related to strengthening, flexibility, endurance, ROM of core, proximal hip and LE for functional self-care, mobility, lifting and ambulation/stair navigation   [] (60425)Reviewed/Progressed HEP activities related to improving balance, coordination, kinesthetic sense, posture, motor skill, proprioception of core, proximal hip and LE for self care, mobility, lifting, and ambulation/stair navigation      Manual Treatments:    IASTM HG prior to DN  Sweeping scanning to medial HS with HG8  Brushing and strumming to medial HS with HG9    Spoke with   regarding the use of Dry Needling     Dry needling manual therapy: consisted on the placement of 3 needles in the following muscles:  semimenbranous/semitendinosis. A 60 mm needle was inserted, piston, rotated, and coned to produce intramuscular mobilization. These techniques were used to restore functional range of motion, reduce muscle spasm and induce healing in the corresponding musculature. (74872)  Clean Technique was utilized today while applying Dry needling treatment. The treatment sites where cleaned with 70% solution of  isopropyl alcohol . The PT washed their hands and utilized treatment gloves along with hand  prior to inserting the needles. All needles where removed and discarded in the appropriate sharps container. MD has given verbal and/or written approval for this treatment. [x] (55204) Provided manual therapy to mobilize LE, proximal hip and/or LS spine soft tissue/joints for the purpose of modulating pain, promoting relaxation,  increasing ROM, reducing/eliminating soft tissue swelling/inflammation/restriction, improving soft tissue extensibility and allowing for proper ROM for normal function with self care, mobility, lifting and ambulation.      Modalities:  none    Charges:  Timed Code Treatment Minutes: 63   Total Treatment Minutes: 63   Time In: 3:30  Time out: 5:00    [] EVAL (LOW) 17305 (typically 20 minutes face-to-face)  [] EVAL (MOD) 04022 (typically 30

## 2020-02-13 ENCOUNTER — HOSPITAL ENCOUNTER (OUTPATIENT)
Dept: PHYSICAL THERAPY | Age: 40
Setting detail: THERAPIES SERIES
Discharge: HOME | End: 2020-02-13
Payer: COMMERCIAL

## 2020-02-13 PROCEDURE — 20560 NDL INSJ W/O NJX 1 OR 2 MUSC: CPT | Performed by: PHYSICAL THERAPIST

## 2020-02-13 PROCEDURE — 97110 THERAPEUTIC EXERCISES: CPT | Performed by: PHYSICAL THERAPIST

## 2020-02-13 PROCEDURE — 97140 MANUAL THERAPY 1/> REGIONS: CPT | Performed by: PHYSICAL THERAPIST

## 2020-02-13 NOTE — FLOWSHEET NOTE
[facility]    Physical Therapy Daily Treatment Note  Date:  2020    Patient Name:  Concepción Gutierres    :  1980  MRN: [id number]  Restrictions/Precautions:    Medical/Treatment Diagnosis Information:  · Diagnosis: S76. 311A  Strain of muscle, fascia and tendon of the posterior muscle group at thigh level, right thigh, initial encounter. · Treatment Diagnosis: M25.551, R26.2 increased pain, decreased flexibility, decreased strength and functional deficits limit ADLs and particaption in sports  Insurance/Certification information:  PT Insurance Information: PT BENEFITS 2019 FACILITY/ ANTHEM/ EFFECTIVE 17/ ACTIVE/ DED 4000 .20/ PAYS 100%/ OOP 4000 .20/ 60 VPCY/ 0 AUTH/ JACOBY REF# [id number]/ 12-3-19 PAG  Physician Information:  Referring Practitioner: Court Duane, MD    Has the plan of care been signed (Y/N):        []  Yes  [x]  No     Date of Patient follow up with Physician: 2/3/20      Is this a Progress Report:     [x]  Yes  []  No        If Yes:  Date Range for reporting period:  Beginning   Ending     Progress report will be due (10 Rx or 30 days whichever is less): 3/82/80      Recertification will be due (POC Duration  / 90 days whichever is less): 3/11/20         Visit # Insurance Allowable Auth Required   6  (2019) 60 []  Yes [x]  No        Functional Scale:         LEFS 71/80=88.75% (11.25% deficit)     Latex Allergy:  [x]NO      []YES  Preferred Language for Healthcare:   [x]English       []other:    Pain level:  0/10     SUBJECTIVE:  Pt states that last Thursday and Friday his HS felt a little achy. He has not done much exercises the last week, was out of town and he has been sick.     OBJECTIVE:              ROM PROM AROM Comments     Left Right Left Right     Flexion WNL WNL        Extension WNL WNL         Abduction             Adduction             ER 38 30         IR 24 20                     hand Work out bench   CC standing extension 3x10 25#    Prone knee flex     Velia wall slide     Side plank 3x30\" R     [place] HS curl 3x6         Weight machines     HS curl     Leg press          Therapeutic Exercise and NMR EXR  [x] (57131) Provided verbal/tactile cueing for activities related to strengthening, flexibility, endurance, ROM for improvements in LE, proximal hip, and core control with self care, mobility, lifting, ambulation.  [] (88249) Provided verbal/tactile cueing for activities related to improving balance, coordination, kinesthetic sense, posture, motor skill, proprioception  to assist with LE, proximal hip, and core control in self care, mobility, lifting, ambulation and eccentric single leg control. NMR and Therapeutic Activities:    [x] (26139 or 58691) Provided verbal/tactile cueing for activities related to improving balance, coordination, kinesthetic sense, posture, motor skill, proprioception and motor activation to allow for proper function of core, proximal hip and LE with self care and ADLs  [] (40142) Gait Re-education- Provided training and instruction to the patient for proper LE, core and proximal hip recruitment and positioning and eccentric body weight control with ambulation re-education including up and down stairs     Home Exercise Program:    Initiated 12/5/19. Printed hand out given. Pt demonstrated proper form of each exercise and expressed verbal understanding of frequency and duration. Updated 12/10/19. Printed hand out given. Pt demonstrated proper form of each exercise and expressed verbal understanding of frequency and duration. Updated 1/16/20. Printed handout given. Pt demonstrated proper form of each exercise and expressed verbal understanding of frequency and duration.   [x] (56758) Reviewed/Progressed HEP activities related to strengthening, flexibility, endurance, ROM of core, proximal hip and LE for functional self-care, mobility, lifting and [x] LW(62910) x 2  [] IONTO  [] NMR (78670) x 1    [] VASO  [x] Manual (63374) x1      [x] Other: DN: 2 muscles  [] TA x      [] Mech Traction (13407)  [] ES(attended) (24772)      [] ES (un) (84422):     GOALS:   Patient stated goal: Return to running in best possible capacity, triathlon in summer   []? Progressing: []? Met: []? Not Met: []? Adjusted     Therapist goals for Patient:   Short Term Goals: To be achieved in: 2 weeks  1. Independent in HEP and progression per patient tolerance, in order to prevent re-injury. [x]? Progressing: []? Met: []? Not Met: []? Adjusted   2. Patient will have a decrease in pain to facilitate improvement in movement, function, and ADLs as indicated by Functional Deficits. [x]? Progressing: []? Met: []? Not Met: []? Adjusted      Long Term Goals: To be achieved in: 8 weeks  1. Disability index score of 5% or less for the LEFS to assist with reaching prior level of function. [x]? Progressing: []? Met: []? Not Met: []? Adjusted  2. Patient will demonstrate increased flexibility to <40 degrees lacking for hamstrings to allow for proper joint functioning as indicated by patients Functional Deficits. [x]? Progressing: []? Met: []? Not Met: []? Adjusted  3. Patient will demonstrate an increase in Strength to 4+/5 in LE to allow for proper functional mobility as indicated by patients Functional Deficits. [x]? Progressing: []? Met: []? Not Met: []? Adjusted  4. Patient will return to runninh without increased symptoms or restriction. [x]? Progressing: []? Met: []? Not Met: []? Adjusted       Overall Progression Towards Functional goals/ Treatment Progress Update:  [] Patient is progressing as expected towards functional goals listed. [] Progression is slowed due to complexities/Impairments listed. [] Progression has been slowed due to co-morbidities.   [x] Plan just implemented, too soon to assess goals progression <30days   [] Goals require adjustment due to lack of progress  [] Patient is not progressing as expected and requires additional follow up with physician  [] Other    Prognosis for POC: [x] Good [] Fair  [] Poor      Patient requires continued skilled intervention: [x] Yes  [] No    Treatment/Activity Tolerance:  [x] Patient able to complete treatment  [] Patient limited by fatigue  [] Patient limited by pain     [] Patient limited by other medical complications  [] Other: 7th session of DN this date. Performed IASTM with HG to medial quad prior to DN again this date, pt tolerated well, adhesions persist but less erythremic response. Continues to get LTR during DN, placed 4 needles along length of medial HS and left in situ for 5' before pistoning each needle, pt tolerated well and stated he liked this technique better. Less adhesions along medial HS following DN. Improved tissue extensibility following needling. Good tolerance to exercise program. Challenged by nordic HS curl and overall reported feeling more tired with exercise program today. Pt requires PT follow up to address ROM, strength and functional mobility deficits. PLAN: See eval  [x] Continue per plan of care [] Alter current plan (see comments above)  [] Plan of care initiated [] Hold pending MD visit [] Discharge      Electronically signed by:  Janae Lebron PT DPT  Physical Therapist  [id number]  [email or web address]    Note: If patient does not return for scheduled/ recommended follow up visits, this note will serve as a discharge from care along with most recent update on progress.

## 2020-02-20 ENCOUNTER — HOSPITAL ENCOUNTER (OUTPATIENT)
Dept: PHYSICAL THERAPY | Age: 40
Setting detail: THERAPIES SERIES
Discharge: HOME | End: 2020-02-20
Payer: COMMERCIAL

## 2020-02-20 PROCEDURE — 20560 NDL INSJ W/O NJX 1 OR 2 MUSC: CPT | Performed by: PHYSICAL THERAPIST

## 2020-02-20 PROCEDURE — 97140 MANUAL THERAPY 1/> REGIONS: CPT | Performed by: PHYSICAL THERAPIST

## 2020-02-20 PROCEDURE — 97110 THERAPEUTIC EXERCISES: CPT | Performed by: PHYSICAL THERAPIST

## 2020-02-20 NOTE — FLOWSHEET NOTE
progress  [] Patient is not progressing as expected and requires additional follow up with physician  [] Other    Prognosis for POC: [x] Good [] Fair  [] Poor      Patient requires continued skilled intervention: [x] Yes  [] No    Treatment/Activity Tolerance:  [x] Patient able to complete treatment  [] Patient limited by fatigue  [] Patient limited by pain     [] Patient limited by other medical complications  [] Other: 8th session of DN this date. Continues to get LTR during DN, placed 4 needles along length of medial HS and left in situ for 3' before pistoning each needle, pt tolerated well, great LTR noted distally. IASTM held this date d/t pt time constraint. Full strength program not performed this date d/t pt time constraint. Pt tolerated strength program well, noted greater HS fatigue this date. Pt requires PT follow up to address ROM, strength and functional mobility deficits. PLAN: See eval  [x] Continue per plan of care [] Alter current plan (see comments above)  [] Plan of care initiated [] Hold pending MD visit [] Discharge      Electronically signed by:  Nancy Euceda PT DPT  Physical Therapist  [id number]  [email or web address]    Note: If patient does not return for scheduled/ recommended follow up visits, this note will serve as a discharge from care along with most recent update on progress.

## 2020-02-27 ENCOUNTER — HOSPITAL ENCOUNTER (OUTPATIENT)
Dept: PHYSICAL THERAPY | Age: 40
Setting detail: THERAPIES SERIES
Discharge: HOME | End: 2020-02-27
Payer: COMMERCIAL